# Patient Record
Sex: FEMALE | Race: WHITE | URBAN - METROPOLITAN AREA
[De-identification: names, ages, dates, MRNs, and addresses within clinical notes are randomized per-mention and may not be internally consistent; named-entity substitution may affect disease eponyms.]

---

## 2019-04-19 ENCOUNTER — OFFICE VISIT (OUTPATIENT)
Dept: URBAN - METROPOLITAN AREA CLINIC 33 | Facility: CLINIC | Age: 44
End: 2019-04-19
Payer: COMMERCIAL

## 2019-04-19 PROCEDURE — 92083 EXTENDED VISUAL FIELD XM: CPT | Performed by: OPTOMETRIST

## 2019-04-19 PROCEDURE — 92134 CPTRZ OPH DX IMG PST SGM RTA: CPT | Performed by: OPTOMETRIST

## 2019-04-19 PROCEDURE — 99214 OFFICE O/P EST MOD 30 MIN: CPT | Performed by: OPTOMETRIST

## 2019-04-19 ASSESSMENT — INTRAOCULAR PRESSURE
OD: 14
OS: 13

## 2019-04-19 ASSESSMENT — KERATOMETRY
OS: 44.25
OD: 44.50

## 2019-04-19 NOTE — IMPRESSION/PLAN
Impression: Dry eye syndrome of bilateral lacrimal glands: H04.123. Plan: Dry eyes account for the patient's complaints. There is no evidence of permanent changes to the cornea. Explained condition does not have a cure and will need artificial tears for maintenance. Discussed Xiidra, Restasis, and punctual plugs. Patient advised to use ATs.

## 2019-04-19 NOTE — IMPRESSION/PLAN
Impression: Other long term (current) drug therapy: Z79.899. Plan: Educated patient about the possible ocular side effects of long-term systemic use of hydroxychloroquine medication. Patient understands the risk of developing macular toxicity from long-term use of medication. Recommend mac-OCT OU and HVF 10-2 (white stimulus) in addition to dilated eye examination to monitor risk of developing maculopathy. No signs of macular toxicity at this time.

## 2020-06-01 ENCOUNTER — OFFICE VISIT (OUTPATIENT)
Dept: URBAN - METROPOLITAN AREA CLINIC 33 | Facility: CLINIC | Age: 45
End: 2020-06-01
Payer: COMMERCIAL

## 2020-06-01 DIAGNOSIS — H04.123 DRY EYE SYNDROME OF BILATERAL LACRIMAL GLANDS: ICD-10-CM

## 2020-06-01 DIAGNOSIS — Z79.899 OTHER LONG TERM (CURRENT) DRUG THERAPY: Primary | ICD-10-CM

## 2020-06-01 PROCEDURE — 92134 CPTRZ OPH DX IMG PST SGM RTA: CPT | Performed by: OPTOMETRIST

## 2020-06-01 PROCEDURE — 99214 OFFICE O/P EST MOD 30 MIN: CPT | Performed by: OPTOMETRIST

## 2020-06-01 ASSESSMENT — INTRAOCULAR PRESSURE
OS: 13
OD: 12

## 2020-06-01 NOTE — IMPRESSION/PLAN
Impression: Dry eye syndrome of bilateral lacrimal glands: H04.123. Plan: Dry eyes account for the patient's complaints. There is no evidence of permanent changes to the cornea. Explained condition does not have a cure and will need artificial tears for maintenance.] Discussed plug insertion and dry eye medication. Recommend patient continues  using ATs.

## 2020-06-01 NOTE — IMPRESSION/PLAN
Impression: Other long term (current) drug therapy: Z79.899. Plan: Educated patient about the possible ocular side effects of long-term systemic use of hydroxychloroquine medication. Patient understands the risk of developing macular toxicity from long-term use of medication. Recommend mac-OCT OU  in addition to dilated eye examination to monitor risk of developing maculopathy. No signs of macular toxicity at this time. 

Ordered and reviewed MAC-OCT

## 2022-11-23 NOTE — IMPRESSION/PLAN
Impression: Other long term (current) drug therapy: Z79.899. Plan: Educated patient about the possible ocular side effects of long-term systemic use of hydroxychloroquine medication. Patient understands the risk of developing macular toxicity from long-term use of medication. Patient has been using Plaquenil since 2013. No signs of macular toxicity at this time.